# Patient Record
(demographics unavailable — no encounter records)

---

## 2025-03-06 NOTE — REASON FOR VISIT
[Initial] : an initial visit [Shortness of Breath] : shortness of breath [Language Line ] : provided by [employer]   [Time Spent: ____ minutes] : Total time spent using  services: [unfilled] minutes. The patient's primary language is not English thus required  services. [TWNoteComboBox1] : Lithuanian

## 2025-03-06 NOTE — HISTORY OF PRESENT ILLNESS
[< 20 pack-years] : < 20 pack-years [Never] : never [Awakes Unrefreshed] : awakes unrefreshed [Daytime Somnolence] : daytime somnolence [Difficulty Maintaining Sleep] : difficulty maintaining sleep [Fatigue] : fatigue [Frequent Nocturnal Awakening] : frequent nocturnal awakening [Nonrestorative Sleep] : nonrestorative sleep [Snoring] : snoring [Tired while Driving] : tired while driving [Unintentional Sleep while Inactive] : unintentional sleep while inactive [Witnessed Apneas] : witnessed apneas [TextBox_4] : Mr. Knowles 44 yo man who presents for the evaluation of shortness of breath and sleep apnea. PMHx HTN, HLD, DM Medications: Takes a statin, DM, HTN med- does not recall names  ID:[id number]  3/5/25- Presents for post discharge hospital follow up. He was recently hospitalized at [facility] for 2 weeks and was told he should have a sleep evaluation for NESTOR due to nocturnal hypoxemia. He was hospitalized due to shortness of breath- had 2 weeks of dyspnea, orthopnea PTA. He was hospitalized similar reasons 2 years ago in [place]- stated it was due to his heart however he could not afford further cardiac workup. He denies orthopnea or dyspnea currently. Has some mild bilateral leg edema but endorses improvement compared to recent hospitalization.    He states his dyspnea is improved at this time as well. 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 - no toxic or occupational inhalation exposures. Social smoker- very minimal. Lost about 10 lbs over the past year. Denies a history of asthma. Denies cough. Has not initiated care with PCP or cardiologist outpatient yet.   Sleep history His bedtime is around 10:30 PM. Wakes up 1x a night to urinate, but not every night. After waking up, he easily falls back asleep. States his wife denies that he snores at night. When he wakes up, he feels refreshed. Denies dry mouth or headache. He denies parasomnias, cataplexy, sleep paralysis, or hypnagogic/hypnopompic hallucinations.  [Awakes with Dry Mouth] : does not awaken with dry mouth [Awakes with Headache] : does not awaken with headache [Cataplexy] : denies cataplexy [Difficulty Initiating Sleep] : does not have difficulty initiating sleep [Dysesthesias] : denies dysesthesias [Hypersomnolence] : denies hypersomnolence [Hypnagogic Hallucinations] : denies hypnagogic hallucinations [Hypnopompic Hallucinations] : denies hypnopompic hallucinations [Paresthesias] : denies paresthesias [Recent  Weight Gain] : no recent weight gain [Sleep Paralysis] : no history of sleep paralysis [Unintentional Sleep while Active] : no unintentional sleep while active [Unusual Movements] : no unusual movements [Unusual Sleep Behavior] : no unusual sleep behavior [Vivid dreams] : no vivid dreams

## 2025-03-06 NOTE — ASSESSMENT
[Obesity, Class III, BMI 40-49.9 (E66.01)] : macrophage activation syndrome [FreeTextEntry1] : Mr. Knowles 44 yo man who presents for the evaluation of shortness of breath and sleep apnea. He was recently hospitalized for 2 weeks at [facility] for ?HF exacerbation with complaints of dyspnea, orthopnea, and lower extremity edema. Endorses near resolution of all symptoms. States he was told by an inpatient cardiologist to evaluate for sleep apnea due to nocturnal hypoxemia noted during the hospitalization.  Plan: # Snoring, report of nocturnal hypoxemia during recent hospitalization at [facility] - At high risk for NESTOR and possibly hypoventilation given elevated BMI (65.57)  - Will start eval of SDB with a Home Sleep Test - Due to uninsured/self pay status, have provided information [phone] for him to obtain financial authorization prior to scheduling the exam weight management discussed # Shortness of breath, orthopnea - Denies a significant smoking history or asthma - No imaging or echo for review - Possibly cardiogenic, possibly related to obesity  will obtain PFT's for further evaluation  - Advised follow up with a cardiologist for further assessment of cardiac status  at this time his dyspnea is improved.  Follow up after results available

## 2025-03-06 NOTE — REASON FOR VISIT
[Initial] : an initial visit [Shortness of Breath] : shortness of breath [Language Line ] : provided by [employer]   [Time Spent: ____ minutes] : Total time spent using  services: [unfilled] minutes. The patient's primary language is not English thus required  services. [TWNoteComboBox1] : Belgian

## 2025-03-06 NOTE — HISTORY OF PRESENT ILLNESS
[< 20 pack-years] : < 20 pack-years [Never] : never [Awakes Unrefreshed] : awakes unrefreshed [Daytime Somnolence] : daytime somnolence [Difficulty Maintaining Sleep] : difficulty maintaining sleep [Fatigue] : fatigue [Frequent Nocturnal Awakening] : frequent nocturnal awakening [Nonrestorative Sleep] : nonrestorative sleep [Snoring] : snoring [Tired while Driving] : tired while driving [Unintentional Sleep while Inactive] : unintentional sleep while inactive [Witnessed Apneas] : witnessed apneas [TextBox_4] : Mr. Knowles 44 yo man who presents for the evaluation of shortness of breath and sleep apnea. PMHx HTN, HLD, DM Medications: Takes a statin, DM, HTN med- does not recall names  ID:[id number]  3/5/25- Presents for post discharge hospital follow up. He was recently hospitalized at [facility] for 2 weeks and was told he should have a sleep evaluation for NESTOR due to nocturnal hypoxemia. He was hospitalized due to shortness of breath- had 2 weeks of dyspnea, orthopnea PTA. He was hospitalized similar reasons 2 years ago in [place]- stated it was due to his heart however he could not afford further cardiac workup. He denies orthopnea or dyspnea currently. Has some mild bilateral leg edema but endorses improvement compared to recent hospitalization.    He states his dyspnea is improved at this time as well. 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 - no toxic or occupational inhalation exposures. Social smoker- very minimal. Lost about 10 lbs over the past year. Denies a history of asthma. Denies cough. Has not initiated care with PCP or cardiologist outpatient yet.   Sleep history His bedtime is around 10:30 PM. Wakes up 1x a night to urinate, but not every night. After waking up, he easily falls back asleep. States his wife denies that he snores at night. When he wakes up, he feels refreshed. Denies dry mouth or headache. He denies parasomnias, cataplexy, sleep paralysis, or hypnagogic/hypnopompic hallucinations.  [Awakes with Dry Mouth] : does not awaken with dry mouth [Awakes with Headache] : does not awaken with headache [Cataplexy] : denies cataplexy [Difficulty Initiating Sleep] : does not have difficulty initiating sleep [Dysesthesias] : denies dysesthesias [Hypersomnolence] : denies hypersomnolence [Hypnagogic Hallucinations] : denies hypnagogic hallucinations [Hypnopompic Hallucinations] : denies hypnopompic hallucinations [Paresthesias] : denies paresthesias [Recent  Weight Gain] : no recent weight gain [Sleep Paralysis] : no history of sleep paralysis [Unintentional Sleep while Active] : no unintentional sleep while active [Unusual Movements] : no unusual movements [Unusual Sleep Behavior] : no unusual sleep behavior [Vivid dreams] : no vivid dreams

## 2025-03-06 NOTE — ASSESSMENT
[Obesity, Class III, BMI 40-49.9 (E66.01)] : macrophage activation syndrome [FreeTextEntry1] : Mr. Knowles 46 yo man who presents for the evaluation of shortness of breath and sleep apnea. He was recently hospitalized for 2 weeks at [facility] for ?HF exacerbation with complaints of dyspnea, orthopnea, and lower extremity edema. Endorses near resolution of all symptoms. States he was told by an inpatient cardiologist to evaluate for sleep apnea due to nocturnal hypoxemia noted during the hospitalization.  Plan: # Snoring, report of nocturnal hypoxemia during recent hospitalization at [facility] - At high risk for NESTOR and possibly hypoventilation given elevated BMI (65.57)  - Will start eval of SDB with a Home Sleep Test - Due to uninsured/self pay status, have provided information [phone] for him to obtain financial authorization prior to scheduling the exam weight management discussed # Shortness of breath, orthopnea - Denies a significant smoking history or asthma - No imaging or echo for review - Possibly cardiogenic, possibly related to obesity  will obtain PFT's for further evaluation  - Advised follow up with a cardiologist for further assessment of cardiac status  at this time his dyspnea is improved.  Follow up after results available